# Patient Record
Sex: FEMALE | Race: WHITE | Employment: PART TIME | URBAN - METROPOLITAN AREA
[De-identification: names, ages, dates, MRNs, and addresses within clinical notes are randomized per-mention and may not be internally consistent; named-entity substitution may affect disease eponyms.]

---

## 2017-02-17 DIAGNOSIS — F41.9 ANXIETY: ICD-10-CM

## 2017-02-17 RX ORDER — ESCITALOPRAM OXALATE 5 MG/1
TABLET ORAL
Qty: 30 TABLET | Refills: 3 | Status: SHIPPED | OUTPATIENT
Start: 2017-02-17 | End: 2017-05-22 | Stop reason: SDUPTHER

## 2017-05-22 ENCOUNTER — OFFICE VISIT (OUTPATIENT)
Dept: FAMILY MEDICINE CLINIC | Age: 32
End: 2017-05-22

## 2017-05-22 VITALS
BODY MASS INDEX: 22.01 KG/M2 | DIASTOLIC BLOOD PRESSURE: 82 MMHG | SYSTOLIC BLOOD PRESSURE: 118 MMHG | HEIGHT: 69 IN | WEIGHT: 148.6 LBS | OXYGEN SATURATION: 99 % | HEART RATE: 97 BPM | RESPIRATION RATE: 13 BRPM

## 2017-05-22 DIAGNOSIS — F41.9 ANXIETY: Primary | ICD-10-CM

## 2017-05-22 PROCEDURE — 99213 OFFICE O/P EST LOW 20 MIN: CPT | Performed by: NURSE PRACTITIONER

## 2017-05-22 RX ORDER — ESCITALOPRAM OXALATE 5 MG/1
TABLET ORAL
Qty: 30 TABLET | Refills: 5 | Status: SHIPPED | OUTPATIENT
Start: 2017-05-22 | End: 2017-06-19

## 2017-05-22 ASSESSMENT — ENCOUNTER SYMPTOMS
VOMITING: 0
CHEST TIGHTNESS: 0
NAUSEA: 0
BACK PAIN: 0
COUGH: 0

## 2017-07-26 ENCOUNTER — TELEPHONE (OUTPATIENT)
Dept: FAMILY MEDICINE CLINIC | Age: 32
End: 2017-07-26

## 2017-08-03 ENCOUNTER — TELEPHONE (OUTPATIENT)
Dept: FAMILY MEDICINE CLINIC | Age: 32
End: 2017-08-03

## 2018-01-29 DIAGNOSIS — F41.9 ANXIETY: ICD-10-CM

## 2018-01-31 RX ORDER — ESCITALOPRAM OXALATE 5 MG/1
TABLET ORAL
Qty: 30 TABLET | Refills: 1 | Status: SHIPPED | OUTPATIENT
Start: 2018-01-31 | End: 2019-05-23

## 2019-05-23 ENCOUNTER — OFFICE VISIT (OUTPATIENT)
Dept: FAMILY MEDICINE CLINIC | Age: 34
End: 2019-05-23
Payer: COMMERCIAL

## 2019-05-23 VITALS
HEART RATE: 64 BPM | TEMPERATURE: 98.2 F | BODY MASS INDEX: 21.51 KG/M2 | SYSTOLIC BLOOD PRESSURE: 92 MMHG | WEIGHT: 145.2 LBS | DIASTOLIC BLOOD PRESSURE: 64 MMHG | RESPIRATION RATE: 16 BRPM | HEIGHT: 69 IN | OXYGEN SATURATION: 98 %

## 2019-05-23 DIAGNOSIS — T14.8XXA BRUISING: Primary | ICD-10-CM

## 2019-05-23 LAB
APTT: 32.2 SEC (ref 26–36)
INR BLD: 0.99 (ref 0.86–1.14)
PROTHROMBIN TIME: 11.3 SEC (ref 9.8–13)

## 2019-05-23 PROCEDURE — 99213 OFFICE O/P EST LOW 20 MIN: CPT | Performed by: NURSE PRACTITIONER

## 2019-05-23 RX ORDER — PHENOL 1.4 %
AEROSOL, SPRAY (ML) MUCOUS MEMBRANE
COMMUNITY

## 2019-05-23 RX ORDER — IODOQUINOL, HYDROCORTISONE ACETATE AND ALOE VERA LEAF 10; 20; 10 MG/G; MG/G; MG/G
GEL TOPICAL
COMMUNITY
End: 2020-09-23

## 2019-05-23 ASSESSMENT — PATIENT HEALTH QUESTIONNAIRE - PHQ9
2. FEELING DOWN, DEPRESSED OR HOPELESS: 0
1. LITTLE INTEREST OR PLEASURE IN DOING THINGS: 0
SUM OF ALL RESPONSES TO PHQ QUESTIONS 1-9: 0
SUM OF ALL RESPONSES TO PHQ QUESTIONS 1-9: 0
SUM OF ALL RESPONSES TO PHQ9 QUESTIONS 1 & 2: 0

## 2019-05-23 ASSESSMENT — ENCOUNTER SYMPTOMS
COLOR CHANGE: 1
NAUSEA: 0
VOMITING: 0
CHEST TIGHTNESS: 0
BACK PAIN: 0

## 2019-05-23 NOTE — PROGRESS NOTES
Subjective:      Patient ID: Aldo Quiles is a 36 y.o. female. HPI     Concerns with bruising on legs. Had labs done with OBGYN at [facility]. Bruising outer right thigh for past 2 weeks, now fading. Bruising lower leg noted Monday when walking. Only noted on legs. Denies menses being heavier. Usually lasts 3 days, neg for clotting. Review of Systems   Constitutional: Negative for appetite change, chills and fever. Respiratory: Negative for chest tightness. Cardiovascular: Negative for chest pain and palpitations. Gastrointestinal: Negative for nausea and vomiting. Musculoskeletal: Negative for arthralgias, back pain and gait problem. Skin: Positive for color change. Neurological: Negative for dizziness and headaches. Psychiatric/Behavioral: Negative. Objective:   Physical Exam   Constitutional: She appears well-developed and well-nourished. No distress. HENT:   Head: Normocephalic and atraumatic. Neck: Normal range of motion. Neck supple. Cardiovascular: Normal rate, regular rhythm and normal heart sounds. Pulmonary/Chest: Effort normal and breath sounds normal. No respiratory distress. Abdominal: Soft. Bowel sounds are normal. She exhibits no distension. Musculoskeletal: Normal range of motion. She exhibits no edema. Lymphadenopathy:     She has no cervical adenopathy. Neurological: She is alert. Skin: Skin is warm. No erythema. Yellowing bruising lateral upper thighs bilateral, unable to palpate hematoma. Bilateral lower lateral legs with scattered bruising, neg for hematoma palpation. Neg for petechiae, other bruising on body. Psychiatric: She has a normal mood and affect.  Her behavior is normal.        Current Outpatient Medications   Medication Sig Dispense Refill    Melatonin 10 MG TABS Take by mouth      Iodoquinol-HC-Aloe Polysacch (ALCORTIN A) 1-2-1 % GEL Apply topically      Prenatal Multivit-Min-Fe-FA (PRE- FORMULA) TABS Take 1 tablet by mouth daily      therapeutic multivitamin-minerals (THERAGRAN-M) tablet Take 1 tablet by mouth daily. OTC       Fexofenadine HCl (ALLEGRA PO) Take by mouth       No current facility-administered medications for this visit. Assessment:      1. Bruising        Plan:      1. .Filipe Smith was seen today for bleeding/bruising. Diagnoses and all orders for this visit:    Bruising  -     VON WILLEBRAND ANTIGEN  -     PROTIME-INR  -     APTT      2. Will await labs for further plan. Suspect trauma is the cause given isolated to legs and healing.      CBC and CMP at [facility] 4/30 normal.         MAT DE ANDA, APRN - CNP

## 2019-08-27 LAB — HIV AG/AB: NONREACTIVE

## 2020-07-09 ENCOUNTER — TELEPHONE (OUTPATIENT)
Dept: FAMILY MEDICINE CLINIC | Age: 35
End: 2020-07-09

## 2020-07-24 ENCOUNTER — TELEPHONE (OUTPATIENT)
Dept: FAMILY MEDICINE CLINIC | Age: 35
End: 2020-07-24

## 2020-07-24 NOTE — TELEPHONE ENCOUNTER
----- Message from Rehana Trimble sent at 7/24/2020  1:27 PM EDT -----  Subject: Message to Provider    QUESTIONS  Information for Provider? pt calling requesting appt for swimmers ear  ---------------------------------------------------------------------------  --------------  CALL BACK INFO  What is the best way for the office to contact you? OK to leave message on   voicemail  Preferred Call Back Phone Number? [phone]  ---------------------------------------------------------------------------  --------------  SCRIPT ANSWERS  Relationship to Patient? Self  Appointment reason? Symptomatic  Select script based on patient symptoms? Adult No Script  (Is the patient requesting to be seen routinely (not today or tomorrow)?    No

## 2020-07-24 NOTE — TELEPHONE ENCOUNTER
Concerned that she has water in ears from float tank. Did 48 hours ago and feels like water still in ears. Denies pain. Crackling sound when moving ears. Will be out of town next week. Recommend using OTC drops for swimmers ear to dry. Monitor If painful, or drainage call and will order ATB drops.

## 2020-09-23 ENCOUNTER — OFFICE VISIT (OUTPATIENT)
Dept: FAMILY MEDICINE CLINIC | Age: 35
End: 2020-09-23
Payer: COMMERCIAL

## 2020-09-23 VITALS
HEIGHT: 68 IN | OXYGEN SATURATION: 99 % | WEIGHT: 152.6 LBS | DIASTOLIC BLOOD PRESSURE: 72 MMHG | HEART RATE: 88 BPM | TEMPERATURE: 97.9 F | RESPIRATION RATE: 16 BRPM | SYSTOLIC BLOOD PRESSURE: 122 MMHG | BODY MASS INDEX: 23.13 KG/M2

## 2020-09-23 PROCEDURE — 90471 IMMUNIZATION ADMIN: CPT | Performed by: NURSE PRACTITIONER

## 2020-09-23 PROCEDURE — 99395 PREV VISIT EST AGE 18-39: CPT | Performed by: NURSE PRACTITIONER

## 2020-09-23 PROCEDURE — 90686 IIV4 VACC NO PRSV 0.5 ML IM: CPT | Performed by: NURSE PRACTITIONER

## 2020-09-23 RX ORDER — FLUOXETINE HYDROCHLORIDE 20 MG/1
20 CAPSULE ORAL DAILY
COMMUNITY
End: 2021-08-11 | Stop reason: SDUPTHER

## 2020-09-23 ASSESSMENT — ENCOUNTER SYMPTOMS
WHEEZING: 0
CONSTIPATION: 0
SHORTNESS OF BREATH: 0
NAUSEA: 0
COUGH: 0
DIARRHEA: 0

## 2020-09-23 ASSESSMENT — PATIENT HEALTH QUESTIONNAIRE - PHQ9
1. LITTLE INTEREST OR PLEASURE IN DOING THINGS: 0
SUM OF ALL RESPONSES TO PHQ QUESTIONS 1-9: 0
2. FEELING DOWN, DEPRESSED OR HOPELESS: 0
SUM OF ALL RESPONSES TO PHQ QUESTIONS 1-9: 0
SUM OF ALL RESPONSES TO PHQ9 QUESTIONS 1 & 2: 0

## 2020-09-23 NOTE — PROGRESS NOTES
Vaccine Information Sheet, \"Influenza - Inactivated\"  given to Shirley Stinson, or parent/legal guardian of  Shirley Stinson and verbalized understanding. Patient responses:    Have you ever had a reaction to a flu vaccine? No  Do you have any current illness? No  Have you ever had Guillian [place] Syndrome? No  Do you have a serious allergy to any of the follow: Neomycin, Polymyxin, Thimerosal, eggs or egg products? No    Flu vaccine given per order. Please see immunization tab. Risks and benefits explained. Current VIS given.

## 2020-09-23 NOTE — PROGRESS NOTES
on file    Food insecurity     Worry: Not on file     Inability: Not on file    Transportation needs     Medical: Not on file     Non-medical: Not on file   Tobacco Use    Smoking status: Never Smoker    Smokeless tobacco: Never Used   Substance and Sexual Activity    Alcohol use: Yes     Alcohol/week: 2.0 standard drinks     Types: 2 Cans of beer per week    Drug use: No    Sexual activity: Yes     Partners: Male   Lifestyle    Physical activity     Days per week: Not on file     Minutes per session: Not on file    Stress: Not on file   Relationships    Social connections     Talks on phone: Not on file     Gets together: Not on file     Attends Jew service: Not on file     Active member of club or organization: Not on file     Attends meetings of clubs or organizations: Not on file     Relationship status: Not on file    Intimate partner violence     Fear of current or ex partner: Not on file     Emotionally abused: Not on file     Physically abused: Not on file     Forced sexual activity: Not on file   Other Topics Concern    Not on file   Social History Narrative    Not on file        Family History   Problem Relation Age of Onset    Anxiety Disorder Mother     Other Father         abdominal aortic aneurysm    Anxiety Disorder Father     Cancer Father         Leukemia    No Known Problems Brother     Other Paternal Grandmother         abdominal aortic aneurysm    No Known Problems Brother        ADVANCE DIRECTIVE: N, <no information>    Vitals:    09/23/20 1448   BP: 122/72   Site: Right Upper Arm   Position: Sitting   Cuff Size: Medium Adult   Pulse: 88   Resp: 16   Temp: 97.9 °F (36.6 °C)   TempSrc: Temporal   SpO2: 99%   Weight: 152 lb 9.6 oz (69.2 kg)   Height: 5' 8.25\" (1.734 m)     Estimated body mass index is 23.03 kg/m² as calculated from the following:    Height as of this encounter: 5' 8.25\" (1.734 m). Weight as of this encounter: 152 lb 9.6 oz (69.2 kg).     Physical Exam  Vitals signs reviewed. Constitutional:       General: She is not in acute distress. Appearance: Normal appearance. She is well-developed. HENT:      Head: Normocephalic. Cardiovascular:      Rate and Rhythm: Normal rate and regular rhythm. Heart sounds: Normal heart sounds. Pulmonary:      Effort: Pulmonary effort is normal.      Breath sounds: Normal breath sounds. Abdominal:      General: Bowel sounds are normal.      Palpations: Abdomen is soft. Musculoskeletal: Normal range of motion. Skin:     General: Skin is warm and dry. Neurological:      General: No focal deficit present. Mental Status: She is alert and oriented to person, place, and time. No flowsheet data found. No results found for: CHOL, CHOLFAST, TRIG, TRIGLYCFAST, HDL, LDLCHOLESTEROL, LDLCALC, GLUF, GLUCOSE, LABA1C    The ASCVD Risk score (Nick Carreon., et al., 2013) failed to calculate for the following reasons: The 2013 ASCVD risk score is only valid for ages 36 to 78    Immunization History   Administered Date(s) Administered    Influenza, Quadv, IM, (6 mo and older Fluzone, Flulaval, Fluarix and 3 yrs and older Afluria) 09/29/2016    Tdap (Boostrix, Adacel) 09/29/2016       Health Maintenance   Topic Date Due    Varicella vaccine (1 of 2 - 2-dose childhood series) 02/07/1987    HIV screen  02/07/2001    Cervical cancer screen  01/01/2014    Flu vaccine (1) 09/01/2020    DTaP/Tdap/Td vaccine (3 - Td) 12/18/2029    Hepatitis A vaccine  Aged Out    Hepatitis B vaccine  Aged Out    Hib vaccine  Aged Out    Meningococcal (ACWY) vaccine  Aged Out    Pneumococcal 0-64 years Vaccine  Aged Out       ASSESSMENT/PLAN:    George Carpenter was seen today for annual exam.    Diagnoses and all orders for this visit:    Preventative health care  -     CBC Auto Differential; Future  -     Comprehensive Metabolic Panel; Future  -     Lipid Panel;  Future    Flu vaccine need  -     INFLUENZA, QUADV, 3 YRS AND OLDER, IM PF, PREFILL SYR OR SDV, 0.5ML (AFLURIA QUADV, PF)    Anxiety      An electronic signature was used to authenticate this note.    --IVANA CERON - CNP on 9/23/2020 at 3:56 PM

## 2020-10-28 ENCOUNTER — TELEPHONE (OUTPATIENT)
Dept: FAMILY MEDICINE CLINIC | Age: 35
End: 2020-10-28

## 2020-10-28 ENCOUNTER — OFFICE VISIT (OUTPATIENT)
Dept: FAMILY MEDICINE CLINIC | Age: 35
End: 2020-10-28
Payer: COMMERCIAL

## 2020-10-28 ENCOUNTER — OFFICE VISIT (OUTPATIENT)
Dept: PRIMARY CARE CLINIC | Age: 35
End: 2020-10-28
Payer: COMMERCIAL

## 2020-10-28 VITALS — HEART RATE: 98 BPM | OXYGEN SATURATION: 99 % | TEMPERATURE: 97.8 F

## 2020-10-28 DIAGNOSIS — Z11.59 SCREENING FOR VIRAL DISEASE: Primary | ICD-10-CM

## 2020-10-28 LAB — S PYO AG THROAT QL: NORMAL

## 2020-10-28 PROCEDURE — 99211 OFF/OP EST MAY X REQ PHY/QHP: CPT | Performed by: FAMILY MEDICINE

## 2020-10-28 PROCEDURE — 99213 OFFICE O/P EST LOW 20 MIN: CPT | Performed by: NURSE PRACTITIONER

## 2020-10-28 PROCEDURE — G8420 CALC BMI NORM PARAMETERS: HCPCS | Performed by: FAMILY MEDICINE

## 2020-10-28 PROCEDURE — G8428 CUR MEDS NOT DOCUMENT: HCPCS | Performed by: FAMILY MEDICINE

## 2020-10-28 PROCEDURE — 87880 STREP A ASSAY W/OPTIC: CPT | Performed by: NURSE PRACTITIONER

## 2020-10-28 ASSESSMENT — ENCOUNTER SYMPTOMS
FACIAL SWELLING: 0
RHINORRHEA: 0
VOICE CHANGE: 0
SINUS PRESSURE: 0
SINUS PAIN: 0
RESPIRATORY NEGATIVE: 1
GASTROINTESTINAL NEGATIVE: 1
EYES NEGATIVE: 1
TROUBLE SWALLOWING: 0
SORE THROAT: 1

## 2020-10-28 NOTE — PATIENT INSTRUCTIONS

## 2020-10-28 NOTE — PROGRESS NOTES
Jozef Salcedo received a viral test for COVID-19. They were educated on isolation and quarantine as appropriate. For any symptoms, they were directed to seek care from their PCP, given contact information to establish with a doctor, directed to an urgent care or the emergency room.

## 2020-10-28 NOTE — PROGRESS NOTES
10/28/2020     Mary Metz (:  1985) is a 30 y.o. female, here for evaluation of the following medical concerns:    ILS Garay is being seen today for a sore throat which started 2 days ago. Denies fever, chills, HA, N/V/D. She went to [facility] thr clinic and was tested for COVID. Review of Systems   Constitutional: Negative. HENT: Positive for sore throat. Negative for congestion, dental problem, ear discharge, ear pain, facial swelling, postnasal drip, rhinorrhea, sinus pressure, sinus pain, sneezing, tinnitus, trouble swallowing and voice change. Eyes: Negative. Respiratory: Negative. Cardiovascular: Negative. Gastrointestinal: Negative. Skin: Negative. Neurological: Negative. Hematological: Negative. Prior to Visit Medications    Medication Sig Taking? Authorizing Provider   Loratadine (CLARITIN PO) Take by mouth daily  Historical Provider, MD   FLUoxetine (PROZAC) 20 MG capsule Take 20 mg by mouth daily  Historical Provider, MD   Melatonin 10 MG TABS Take by mouth  Historical Provider, MD        Social History     Tobacco Use    Smoking status: Never Smoker    Smokeless tobacco: Never Used   Substance Use Topics    Alcohol use: Yes     Alcohol/week: 2.0 standard drinks     Types: 2 Cans of beer per week        Vitals:    10/28/20 1336   Pulse: 98   Temp: 97.8 °F (36.6 °C)   SpO2: 99%     Estimated body mass index is 23.03 kg/m² as calculated from the following:    Height as of 20: 5' 8.25\" (1.734 m). Weight as of 20: 152 lb 9.6 oz (69.2 kg). Physical Exam  Vitals signs reviewed. Constitutional:       General: She is not in acute distress. Appearance: Normal appearance. She is ill-appearing. She is not diaphoretic. HENT:      Head: Normocephalic. Right Ear: There is no impacted cerumen. Left Ear: There is no impacted cerumen. Nose: Congestion and rhinorrhea present.       Mouth/Throat:      Mouth: Mucous membranes are moist.      Pharynx: Oropharynx is clear. Posterior oropharyngeal erythema present. No oropharyngeal exudate. Eyes:      General:         Right eye: No discharge. Left eye: No discharge. Pupils: Pupils are equal, round, and reactive to light. Neck:      Musculoskeletal: Normal range of motion. No neck rigidity. Cardiovascular:      Rate and Rhythm: Normal rate and regular rhythm. Heart sounds: Normal heart sounds. No murmur. Pulmonary:      Effort: Pulmonary effort is normal. No respiratory distress. Breath sounds: Normal breath sounds. No stridor. No wheezing or rhonchi. Lymphadenopathy:      Cervical: No cervical adenopathy. Skin:     General: Skin is warm and dry. Findings: No rash. Neurological:      Mental Status: She is alert and oriented to person, place, and time. ASSESSMENT/PLAN:  1. Acute viral pharyngitis  Rapid strep test was negative. Pt was tested at [facility] drive thru today for COVID. Will sent throat culture and notify pt of results. - POCT rapid strep A  - Culture, Throat      Return if symptoms worsen or fail to improve. An electronic signature was used to authenticate this note.     --IVANA Mohamud - CNP on 10/28/2020 at 1:46 PM

## 2020-10-30 LAB — SARS-COV-2, NAA: NOT DETECTED

## 2020-10-31 LAB — THROAT CULTURE: NORMAL

## 2020-11-03 RX ORDER — PENICILLIN V POTASSIUM 500 MG/1
500 TABLET ORAL 2 TIMES DAILY
Qty: 20 TABLET | Refills: 0 | Status: SHIPPED | OUTPATIENT
Start: 2020-11-03 | End: 2020-11-13

## 2020-11-12 DIAGNOSIS — Z00.00 PREVENTATIVE HEALTH CARE: ICD-10-CM

## 2020-11-12 LAB
A/G RATIO: 1.7 (ref 1.1–2.2)
ALBUMIN SERPL-MCNC: 4.8 G/DL (ref 3.4–5)
ALP BLD-CCNC: 57 U/L (ref 40–129)
ALT SERPL-CCNC: 13 U/L (ref 10–40)
ANION GAP SERPL CALCULATED.3IONS-SCNC: 13 MMOL/L (ref 3–16)
AST SERPL-CCNC: 16 U/L (ref 15–37)
BASOPHILS ABSOLUTE: 0 K/UL (ref 0–0.2)
BASOPHILS RELATIVE PERCENT: 0.2 %
BILIRUB SERPL-MCNC: 0.5 MG/DL (ref 0–1)
BUN BLDV-MCNC: 10 MG/DL (ref 7–20)
CALCIUM SERPL-MCNC: 9.4 MG/DL (ref 8.3–10.6)
CHLORIDE BLD-SCNC: 104 MMOL/L (ref 99–110)
CHOLESTEROL, TOTAL: 174 MG/DL (ref 0–199)
CO2: 22 MMOL/L (ref 21–32)
CREAT SERPL-MCNC: 0.6 MG/DL (ref 0.6–1.1)
EOSINOPHILS ABSOLUTE: 0.2 K/UL (ref 0–0.6)
EOSINOPHILS RELATIVE PERCENT: 1.8 %
GFR AFRICAN AMERICAN: >60
GFR NON-AFRICAN AMERICAN: >60
GLOBULIN: 2.8 G/DL
GLUCOSE BLD-MCNC: 84 MG/DL (ref 70–99)
HCT VFR BLD CALC: 41.2 % (ref 36–48)
HDLC SERPL-MCNC: 55 MG/DL (ref 40–60)
HEMOGLOBIN: 14 G/DL (ref 12–16)
LDL CHOLESTEROL CALCULATED: 88 MG/DL
LYMPHOCYTES ABSOLUTE: 2.2 K/UL (ref 1–5.1)
LYMPHOCYTES RELATIVE PERCENT: 23.1 %
MCH RBC QN AUTO: 32.1 PG (ref 26–34)
MCHC RBC AUTO-ENTMCNC: 34 G/DL (ref 31–36)
MCV RBC AUTO: 94.2 FL (ref 80–100)
MONOCYTES ABSOLUTE: 0.4 K/UL (ref 0–1.3)
MONOCYTES RELATIVE PERCENT: 4 %
NEUTROPHILS ABSOLUTE: 6.8 K/UL (ref 1.7–7.7)
NEUTROPHILS RELATIVE PERCENT: 70.9 %
PDW BLD-RTO: 13.1 % (ref 12.4–15.4)
PLATELET # BLD: 297 K/UL (ref 135–450)
PMV BLD AUTO: 7.3 FL (ref 5–10.5)
POTASSIUM SERPL-SCNC: 3.9 MMOL/L (ref 3.5–5.1)
RBC # BLD: 4.38 M/UL (ref 4–5.2)
SODIUM BLD-SCNC: 139 MMOL/L (ref 136–145)
TOTAL PROTEIN: 7.6 G/DL (ref 6.4–8.2)
TRIGL SERPL-MCNC: 157 MG/DL (ref 0–150)
VLDLC SERPL CALC-MCNC: 31 MG/DL
WBC # BLD: 9.6 K/UL (ref 4–11)

## 2020-11-23 ENCOUNTER — OFFICE VISIT (OUTPATIENT)
Dept: PRIMARY CARE CLINIC | Age: 35
End: 2020-11-23
Payer: COMMERCIAL

## 2020-11-23 PROCEDURE — 99211 OFF/OP EST MAY X REQ PHY/QHP: CPT | Performed by: NURSE PRACTITIONER

## 2020-11-23 NOTE — PATIENT INSTRUCTIONS
Advance Care Planning  People with COVID-19 may have no symptoms, mild symptoms, such as fever, cough, and shortness of breath or they may have more severe illness, developing severe and fatal pneumonia. As a result, Advance Care Planning with attention to naming a health care decision maker (someone you trust to make healthcare decisions for you if you could not speak for yourself) and sharing other health care preferences is important BEFORE a possible health crisis. Please contact your Primary Care Provider to discuss Advance Care Planning. Preventing the Spread of Coronavirus Disease 2019 in Homes and Residential Communities  For the most recent information go to [email or web address]    Prevention steps for People with confirmed or suspected COVID-19 (including persons under investigation) who do not need to be hospitalized  and   People with confirmed COVID-19 who were hospitalized and determined to be medically stable to go home    Your healthcare provider and public health staff will evaluate whether you can be cared for at home. If it is determined that you do not need to be hospitalized and can be isolated at home, you will be monitored by staff from your local or state health department. You should follow the prevention steps below until a healthcare provider or local or state health department says you can return to your normal activities. Stay home except to get medical care  People who are mildly ill with COVID-19 are able to isolate at home during their illness. You should restrict activities outside your home, except for getting medical care. Do not go to work, school, or public areas. Avoid using public transportation, ride-sharing, or taxis. Separate yourself from other people and animals in your home  People: As much as possible, you should stay in a specific room and away from other people in your home.  Also, you should use a separate have a medical emergency and need to call 911, notify the dispatch personnel that you have, or are being evaluated for COVID-19. If possible, put on a facemask before emergency medical services arrive. Discontinuing home isolation  Patients with confirmed COVID-19 should remain under home isolation precautions until the risk of secondary transmission to others is thought to be low. The decision to discontinue home isolation precautions should be made on a case-by-case basis, in consultation with healthcare providers and state and local health departments.

## 2020-11-24 LAB — SARS-COV-2, NAA: NOT DETECTED

## 2021-08-10 ENCOUNTER — TELEPHONE (OUTPATIENT)
Dept: FAMILY MEDICINE CLINIC | Age: 36
End: 2021-08-10

## 2021-08-10 NOTE — TELEPHONE ENCOUNTER
Pt. States she had a BP checked at [facility] today by pharmacist using a wrist cuff and it was 169/105 then 10 mins later 152/101. States her Bp at the sleep clinic this am was 128/99. Pt. Does not have any headache or chest pain. \" feels perfectly fine\"   Does not have history of HTN. I did schedule Evelyn for an appointment tomorrow and advised her that if she would start with any concerning symptoms such as chest pain, SOB, or headache she should be evaluated at the ER.

## 2021-08-11 ENCOUNTER — OFFICE VISIT (OUTPATIENT)
Dept: FAMILY MEDICINE CLINIC | Age: 36
End: 2021-08-11
Payer: COMMERCIAL

## 2021-08-11 VITALS
OXYGEN SATURATION: 98 % | DIASTOLIC BLOOD PRESSURE: 86 MMHG | HEART RATE: 76 BPM | RESPIRATION RATE: 18 BRPM | WEIGHT: 158 LBS | BODY MASS INDEX: 23.85 KG/M2 | SYSTOLIC BLOOD PRESSURE: 132 MMHG

## 2021-08-11 DIAGNOSIS — R03.0 ELEVATED BLOOD PRESSURE READING: ICD-10-CM

## 2021-08-11 DIAGNOSIS — F41.9 ANXIETY: Primary | ICD-10-CM

## 2021-08-11 PROCEDURE — G8427 DOCREV CUR MEDS BY ELIG CLIN: HCPCS | Performed by: NURSE PRACTITIONER

## 2021-08-11 PROCEDURE — G8420 CALC BMI NORM PARAMETERS: HCPCS | Performed by: NURSE PRACTITIONER

## 2021-08-11 PROCEDURE — 99214 OFFICE O/P EST MOD 30 MIN: CPT | Performed by: NURSE PRACTITIONER

## 2021-08-11 PROCEDURE — 1036F TOBACCO NON-USER: CPT | Performed by: NURSE PRACTITIONER

## 2021-08-11 RX ORDER — LORATADINE 10 MG/1
10 TABLET ORAL DAILY
COMMUNITY
End: 2021-11-02

## 2021-08-11 RX ORDER — FLUOXETINE HYDROCHLORIDE 40 MG/1
40 CAPSULE ORAL DAILY
Qty: 90 CAPSULE | Refills: 1 | Status: SHIPPED | OUTPATIENT
Start: 2021-08-11 | End: 2021-08-24 | Stop reason: SDUPTHER

## 2021-08-11 SDOH — ECONOMIC STABILITY: FOOD INSECURITY: WITHIN THE PAST 12 MONTHS, YOU WORRIED THAT YOUR FOOD WOULD RUN OUT BEFORE YOU GOT MONEY TO BUY MORE.: NEVER TRUE

## 2021-08-11 SDOH — ECONOMIC STABILITY: FOOD INSECURITY: WITHIN THE PAST 12 MONTHS, THE FOOD YOU BOUGHT JUST DIDN'T LAST AND YOU DIDN'T HAVE MONEY TO GET MORE.: NEVER TRUE

## 2021-08-11 ASSESSMENT — PATIENT HEALTH QUESTIONNAIRE - PHQ9
2. FEELING DOWN, DEPRESSED OR HOPELESS: 0
SUM OF ALL RESPONSES TO PHQ QUESTIONS 1-9: 0
SUM OF ALL RESPONSES TO PHQ QUESTIONS 1-9: 0
1. LITTLE INTEREST OR PLEASURE IN DOING THINGS: 0
SUM OF ALL RESPONSES TO PHQ QUESTIONS 1-9: 0
2. FEELING DOWN, DEPRESSED OR HOPELESS: 0
1. LITTLE INTEREST OR PLEASURE IN DOING THINGS: 0
SUM OF ALL RESPONSES TO PHQ9 QUESTIONS 1 & 2: 0
SUM OF ALL RESPONSES TO PHQ QUESTIONS 1-9: 0
SUM OF ALL RESPONSES TO PHQ QUESTIONS 1-9: 0
2. FEELING DOWN, DEPRESSED OR HOPELESS: 0
SUM OF ALL RESPONSES TO PHQ9 QUESTIONS 1 & 2: 0
SUM OF ALL RESPONSES TO PHQ QUESTIONS 1-9: 0

## 2021-08-11 ASSESSMENT — SOCIAL DETERMINANTS OF HEALTH (SDOH): HOW HARD IS IT FOR YOU TO PAY FOR THE VERY BASICS LIKE FOOD, HOUSING, MEDICAL CARE, AND HEATING?: NOT HARD AT ALL

## 2021-08-11 NOTE — PROGRESS NOTES
Speedy Funk (:  1985) is a 29 y.o. female,Established patient, here for evaluation of the following chief complaint(s):  Hypertension         ASSESSMENT/PLAN:  1. Anxiety  -     FLUoxetine (PROZAC) 40 MG capsule; Take 1 capsule by mouth daily, Disp-90 capsule, R-1Normal    Blood pressure stable today. Do not feel BP is an issue. Limited lori in [employer] wrist cuff. Discussed low sodium diet, less than 2000 mg daily    Will increase prozac to 40 mg daily for added stressors at home. Monitor for improvement    Follow up with GYN next week is planned, He initially px the prozac. Return in about 17 weeks (around 2021) for routine office visit. Subjective   SUBJECTIVE/OBJECTIVE:  HPI     Yesterday went to Sleep center and BP was 128/99, not sure of the type of test. Went to [employer] wrist cuff and was higher. Having some stress at home with husbands mental health issues and angered by their toddler at times. Review of Systems   All other systems reviewed and are negative. Objective   Physical Exam  Constitutional:       Appearance: Normal appearance. She is well-developed. HENT:      Head: Normocephalic and atraumatic. Neck:      Thyroid: No thyromegaly. Vascular: No carotid bruit. Cardiovascular:      Rate and Rhythm: Normal rate and regular rhythm. Pulmonary:      Effort: Pulmonary effort is normal.      Breath sounds: Normal breath sounds. Abdominal:      General: Bowel sounds are normal.      Palpations: Abdomen is soft. Musculoskeletal:         General: Normal range of motion. Cervical back: Normal range of motion and neck supple. Skin:     General: Skin is warm. Neurological:      Mental Status: She is alert.    Psychiatric:         Behavior: Behavior normal.              Current Outpatient Medications   Medication Sig Dispense Refill    loratadine (CLARITIN) 10 MG tablet Take 10 mg by mouth daily      Norethindrone Acet-Ethinyl Est (LOESTRIN 1/20, 21, PO) Take 1 tablet by mouth daily      FLUoxetine (PROZAC) 40 MG capsule Take 1 capsule by mouth daily 90 capsule 1    Melatonin 10 MG TABS Take by mouth       No current facility-administered medications for this visit.              An electronic signature was used to authenticate this note.    --MAT DE ANDA, IVANA - CNP

## 2021-08-24 DIAGNOSIS — F41.9 ANXIETY: ICD-10-CM

## 2021-08-24 RX ORDER — FLUOXETINE HYDROCHLORIDE 20 MG/1
20 CAPSULE ORAL DAILY
Qty: 90 CAPSULE | Refills: 1 | Status: SHIPPED | OUTPATIENT
Start: 2021-08-24 | End: 2021-11-02 | Stop reason: SDUPTHER

## 2021-11-02 ENCOUNTER — OFFICE VISIT (OUTPATIENT)
Dept: FAMILY MEDICINE CLINIC | Age: 36
End: 2021-11-02
Payer: COMMERCIAL

## 2021-11-02 VITALS
RESPIRATION RATE: 18 BRPM | DIASTOLIC BLOOD PRESSURE: 80 MMHG | OXYGEN SATURATION: 97 % | SYSTOLIC BLOOD PRESSURE: 130 MMHG | WEIGHT: 159.8 LBS | BODY MASS INDEX: 23.67 KG/M2 | HEIGHT: 69 IN | HEART RATE: 80 BPM

## 2021-11-02 DIAGNOSIS — Z23 FLU VACCINE NEED: ICD-10-CM

## 2021-11-02 DIAGNOSIS — L91.8 SKIN TAG: ICD-10-CM

## 2021-11-02 DIAGNOSIS — Z00.00 ENCOUNTER FOR WELL ADULT EXAM WITHOUT ABNORMAL FINDINGS: Primary | ICD-10-CM

## 2021-11-02 DIAGNOSIS — Z80.3 FAMILY HISTORY OF BREAST CANCER: ICD-10-CM

## 2021-11-02 DIAGNOSIS — Z11.59 ENCOUNTER FOR HCV SCREENING TEST FOR LOW RISK PATIENT: ICD-10-CM

## 2021-11-02 DIAGNOSIS — F41.9 ANXIETY: ICD-10-CM

## 2021-11-02 PROCEDURE — 90674 CCIIV4 VAC NO PRSV 0.5 ML IM: CPT | Performed by: NURSE PRACTITIONER

## 2021-11-02 PROCEDURE — 99395 PREV VISIT EST AGE 18-39: CPT | Performed by: NURSE PRACTITIONER

## 2021-11-02 PROCEDURE — G8482 FLU IMMUNIZE ORDER/ADMIN: HCPCS | Performed by: NURSE PRACTITIONER

## 2021-11-02 PROCEDURE — 90471 IMMUNIZATION ADMIN: CPT | Performed by: NURSE PRACTITIONER

## 2021-11-02 RX ORDER — FLUOXETINE HYDROCHLORIDE 20 MG/1
20 CAPSULE ORAL DAILY
Qty: 90 CAPSULE | Refills: 3 | Status: SHIPPED | OUTPATIENT
Start: 2021-11-02

## 2021-11-02 RX ORDER — NORETHINDRONE ACETATE AND ETHINYL ESTRADIOL, AND FERROUS FUMARATE 1MG-20(24)
1 KIT ORAL DAILY
COMMUNITY
Start: 2021-10-31

## 2021-11-02 ASSESSMENT — ENCOUNTER SYMPTOMS
CHEST TIGHTNESS: 0
DIARRHEA: 0
NAUSEA: 0
SHORTNESS OF BREATH: 0
BACK PAIN: 0
TROUBLE SWALLOWING: 0

## 2021-11-02 NOTE — PROGRESS NOTES
Well Adult Note  Name: Yareli Camara Date: 2021   MRN: <[id number]> Sex: Female   Age: 29 y.o. Ethnicity: Non- / Non    : 1985 Race: White (non-)      Fidelina Heaton is here for well adult exam.  History:    Taking prozac and decided to stay at 20 mg. Wondering if is causing bruising. Has a mole on back for extended time. Not sure if has had changes. Has appt in 1 week with rectal surgeon, Dr. Gemma Mahoney that removed anal warts and hemorrhoids. Now with purple enlarged area but not tender. Wondering if she should move the appt up. Review of Systems   Constitutional: Negative for chills, fever and unexpected weight change. HENT: Negative for trouble swallowing. Respiratory: Negative for chest tightness and shortness of breath. Cardiovascular: Negative for chest pain and palpitations. Gastrointestinal: Negative for diarrhea and nausea. Genitourinary: Negative for difficulty urinating. Musculoskeletal: Negative for arthralgias, back pain and gait problem. Neurological: Negative for dizziness and headaches. Psychiatric/Behavioral: Negative. Allergies   Allergen Reactions    Promethazine-Codeine Rash    Diflucan [Fluconazole] Other (See Comments)     Tongue numb day after taken         Prior to Visit Medications    Medication Sig Taking?  Authorizing Provider   DEVONTE 24 FE 1-20 MG-MCG(24) TABS Take 1 tablet by mouth daily Yes Historical Provider, MD   FLUoxetine (PROZAC) 20 MG capsule Take 1 capsule by mouth daily Yes IVANA Campbell - CNP   Melatonin 10 MG TABS Take by mouth Yes Historical Provider, MD         Past Medical History:   Diagnosis Date    Anxiety 2016       Past Surgical History:   Procedure Laterality Date    COLONOSCOPY  2016    Dr. Phil Cannon to be normal.     GUM SURGERY      HEMORRHOID SURGERY  2016    internal hemorrhoid and anal warts    WISDOM TOOTH EXTRACTION           Family History Problem Relation Age of Onset    Anxiety Disorder Mother     Other Father         abdominal aortic aneurysm    Anxiety Disorder Father     Cancer Father         Leukemia    High Blood Pressure Father     No Known Problems Brother     Other Paternal Grandmother         abdominal aortic aneurysm    No Known Problems Brother     Other Maternal Grandmother         hx rheumatic fever as child and passes away during second pregnancy related to heart disease    Cancer Paternal Grandfather     Breast Cancer Paternal Cousin 39        breast       Social History     Tobacco Use    Smoking status: Never Smoker    Smokeless tobacco: Never Used   Vaping Use    Vaping Use: Never used   Substance Use Topics    Alcohol use: Yes     Alcohol/week: 2.0 standard drinks     Types: 2 Cans of beer per week    Drug use: No       Objective   /80 (Site: Right Upper Arm, Position: Sitting, Cuff Size: Medium Adult)   Pulse 80   Resp 18   Ht 5' 9\" (1.753 m)   Wt 159 lb 12.8 oz (72.5 kg)   LMP 11/01/2021   SpO2 97%   BMI 23.60 kg/m²   Wt Readings from Last 3 Encounters:   11/02/21 159 lb 12.8 oz (72.5 kg)   08/11/21 158 lb (71.7 kg)   09/23/20 152 lb 9.6 oz (69.2 kg)       Physical Exam  Constitutional:       Appearance: Normal appearance. She is well-developed. HENT:      Head: Normocephalic and atraumatic. Neck:      Thyroid: No thyromegaly. Cardiovascular:      Rate and Rhythm: Normal rate and regular rhythm. Pulmonary:      Effort: Pulmonary effort is normal.      Breath sounds: Normal breath sounds. Abdominal:      General: Bowel sounds are normal.      Palpations: Abdomen is soft. Musculoskeletal:         General: Normal range of motion. Cervical back: Normal range of motion and neck supple. Skin:     General: Skin is warm. Comments: Skin lesion on back of concern is natural colored, soft, raised, wide base, neg for discoloration or irregular growth.     Neurological:      Mental Status: She is alert. Psychiatric:         Behavior: Behavior normal.           Assessment   Plan   1. Family history of breast cancer  -     Marlene Rios MD, Breast Surgery, [facility]  2. Anxiety  -     FLUoxetine (PROZAC) 20 MG capsule; Take 1 capsule by mouth daily, Disp-90 capsule, R-3Normal  -     Comprehensive Metabolic Panel  3. Skin tag  -     AFL - Norma Weaver DO, Dermatology, [facility]  4. Encounter for HCV screening test for low risk patient  -     Hepatitis C Antibody  5. Flu vaccine need  -     INFLUENZA, MDCK QUADV, 2 YRS AND OLDER, IM, PF, PREFILL SYR OR SDV, 0.5ML (FLUCELVAX QUADV, PF)  6. Encounter for well adult exam without abnormal findings     Requesting to establish with dermatology for routine evaluation. Continue prozac at 20 mg daily. Will refer to Breast specialist for discussion around breast cancer risk. Discussed paternal cousin is isolated case and distant in relationship.      Encouraged monthly breast self exam.       Personalized Preventive Plan   Current Health Maintenance Status  Immunization History   Administered Date(s) Administered    COVID-19, Pfizer, PF, 30mcg/0.3mL 03/29/2021, 04/19/2021    Influenza, Quadv, IM, (6 mo and older Fluzone, Flulaval, Fluarix and 3 yrs and older Afluria) 09/29/2016    Influenza, Quadv, IM, PF (6 mo and older Fluzone, Flulaval, Fluarix, and 3 yrs and older Afluria) 09/23/2020    Tdap (Boostrix, Adacel) 09/29/2016        Health Maintenance   Topic Date Due    Hepatitis C screen  Never done    Varicella vaccine (1 of 2 - 2-dose childhood series) Never done    HIV screen  Never done    Flu vaccine (1) 09/01/2021    Cervical cancer screen  08/11/2023    DTaP/Tdap/Td vaccine (2 - Td or Tdap) 09/29/2026    COVID-19 Vaccine  Completed    Hepatitis A vaccine  Aged Out    Hepatitis B vaccine  Aged Out    Hib vaccine  Aged Out    Meningococcal (ACWY) vaccine  Aged Out    Pneumococcal 0-64 years Vaccine Aged Out     Recommendations for Preventive Services Due: see orders and patient instructions/AVS.  .

## 2021-11-02 NOTE — PROGRESS NOTES
Vaccine Information Sheet, \"Influenza - Inactivated\"  given to Stalin Jay, or parent/legal guardian of  Stalin Jay and verbalized understanding. Patient responses:    Have you ever had a reaction to a flu vaccine? No  Do you have any current illness? No  Have you ever had Guillian [place] Syndrome? No  Do you have a serious allergy to any of the follow: Neomycin, Polymyxin, Thimerosal, eggs or egg products? No    Flu vaccine given per order. Please see immunization tab. Risks and benefits explained. Current VIS given.       Immunizations Administered     Name Date Dose Route    Influenza, MDCK Quadv, IM, PF (Flucelvax 2 yrs and older) 11/2/2021 0.5 mL Intramuscular    Site: Deltoid- Left    Lot: [id number]    NDC: 21563-948-49

## 2021-12-07 NOTE — PROGRESS NOTES
Gynecologic History  Menarche at age 18    She delivered her first child at age 30, and did breastfeed  Premenopausal  No hysterectomy  Oral contraceptive use: yes for 15 years and is not currently taking  Hormone use: no and is not currently taking    Bra Size: \"Small\"    Review of Systems   Constitutional: Negative for unexpected weight change. Eyes: Negative for visual disturbance. Respiratory: Negative for cough and shortness of breath. Cardiovascular: Negative for chest pain and palpitations. Gastrointestinal: Negative for abdominal pain. Musculoskeletal: Negative for arthralgias and myalgias. Neurological: Negative for headaches. Hematological: Negative for adenopathy. Bruises/bleeds easily. Psychiatric/Behavioral: Negative for dysphoric mood. The patient is nervous/anxious (Fluoxitine).

## 2021-12-10 ENCOUNTER — OFFICE VISIT (OUTPATIENT)
Dept: SURGERY | Age: 36
End: 2021-12-10
Payer: COMMERCIAL

## 2021-12-10 VITALS
HEIGHT: 69 IN | RESPIRATION RATE: 16 BRPM | OXYGEN SATURATION: 98 % | SYSTOLIC BLOOD PRESSURE: 142 MMHG | BODY MASS INDEX: 23.55 KG/M2 | TEMPERATURE: 98.5 F | WEIGHT: 159 LBS | HEART RATE: 81 BPM | DIASTOLIC BLOOD PRESSURE: 101 MMHG

## 2021-12-10 DIAGNOSIS — Z80.3 FAMILY HISTORY OF BREAST CANCER: Primary | ICD-10-CM

## 2021-12-10 PROCEDURE — G8482 FLU IMMUNIZE ORDER/ADMIN: HCPCS | Performed by: SURGERY

## 2021-12-10 PROCEDURE — G8427 DOCREV CUR MEDS BY ELIG CLIN: HCPCS | Performed by: SURGERY

## 2021-12-10 PROCEDURE — 99243 OFF/OP CNSLTJ NEW/EST LOW 30: CPT | Performed by: SURGERY

## 2021-12-10 PROCEDURE — G8420 CALC BMI NORM PARAMETERS: HCPCS | Performed by: SURGERY

## 2021-12-10 ASSESSMENT — ENCOUNTER SYMPTOMS
ABDOMINAL PAIN: 0
SHORTNESS OF BREATH: 0
COUGH: 0

## 2021-12-10 NOTE — PROGRESS NOTES
12/10/21    CHIEF COMPLAINT:  Family history of breast cancer. HISTORY OF PRESENT ILLNESS:  Pedro Ann is a 29 y.o. woman referred by BORA Talley who requested that I evaluate her for her family history of breast cancer. She presents today to discuss her individualized risk for breast cancer, the role of genetic testing, and the best mechanism for surveillance and/or prevention. The patient states that she herself has not noticed any abnormal masses in either breast.  She denies any change in the appearance of her breasts or the skin of her breasts. She denies bilateral nipple discharge. She has no other systemic complaints and otherwise feels well today. Pertinent Family History: A paternal cousin was diagnosed with breast cancer in her early 45s. (This was her dad's sister's daughter). She believes this cousin was positive for BRCA2 mutation.  (Of note, this cousin's father's sister was diagnosed with breast cancer at an unknown age.)    GYNECOLOGIC HISTORY:  Menarche at age 16    She delivered her first child at age 30, and did breastfeed  Premenopausal  No hysterectomy  Oral contraceptive use: yes for 15 years and is not currently taking  Hormone use: no and is not currently taking    Past Medical History:   Diagnosis Date    Anxiety 2016     Past Surgical History:   Procedure Laterality Date    COLONOSCOPY  2016    Dr. Tyson Driver to be normal.     GUM SURGERY      HEMORRHOID SURGERY  2016    internal hemorrhoid and anal warts    WISDOM TOOTH EXTRACTION  2004     Current Outpatient Medications   Medication Sig Dispense Refill    [place] 24 FE 1-20 MG-MCG(24) TABS Take 1 tablet by mouth daily      FLUoxetine (PROZAC) 20 MG capsule Take 1 capsule by mouth daily 90 capsule 3    Melatonin 10 MG TABS Take by mouth       No current facility-administered medications for this visit.      Allergies   Allergen Reactions    Promethazine-Codeine Rash    Diflucan [Fluconazole] Other (See Comments)     Tongue numb day after taken     Social History     Socioeconomic History    Marital status:      Spouse name: Not on file    Number of children: Not on file    Years of education: Not on file    Highest education level: Not on file   Occupational History    Not on file   Tobacco Use    Smoking status: Never Smoker    Smokeless tobacco: Never Used   Vaping Use    Vaping Use: Never used   Substance and Sexual Activity    Alcohol use: Yes     Alcohol/week: 2.0 standard drinks     Types: 2 Cans of beer per week    Drug use: No    Sexual activity: Yes     Partners: Male   Other Topics Concern    Not on file   Social History Narrative    Not on file     Social Determinants of Health     Financial Resource Strain: Low Risk     Difficulty of Paying Living Expenses: Not hard at all   Food Insecurity: No Food Insecurity    Worried About [address] in the Last Year: Never true    [address] in the Last Year: Never true   Transportation Needs:     Lack of Transportation (Medical): Not on file    Lack of Transportation (Non-Medical):  Not on file   Physical Activity:     Days of Exercise per Week: Not on file    Minutes of Exercise per Session: Not on file   Stress:     Feeling of Stress : Not on file   Social Connections:     Frequency of Communication with Friends and Family: Not on file    Frequency of Social Gatherings with Friends and Family: Not on file    Attends Orthodoxy Services: Not on file    Active Member of Clubs or Organizations: Not on file    Attends Club or Organization Meetings: Not on file    Marital Status: Not on file   Intimate Partner Violence:     Fear of Current or Ex-Partner: Not on file    Emotionally Abused: Not on file    Physically Abused: Not on file    Sexually Abused: Not on file   Housing Stability:     Unable to Pay for Housing in the Last Year: Not on file    Number of [place] in the Last Year: Not on file    Unstable Housing in the Last Year: Not on file     Family History   Problem Relation Age of Onset    Anxiety Disorder Mother     Other Father         abdominal aortic aneurysm    Anxiety Disorder Father     Cancer Father         Leukemia    High Blood Pressure Father     No Known Problems Brother     Other Paternal Grandmother         abdominal aortic aneurysm    No Known Problems Brother     Other Maternal Grandmother         hx rheumatic fever as child and passes away during second pregnancy related to heart disease    Cancer Paternal Grandfather     Breast Cancer Paternal Cousin 39        breast     REVIEW OF SYSTEMS:  Constitutional: Negative for unexpected weight change. Eyes: Negative for visual disturbance. Respiratory: Negative for cough and shortness of breath. Cardiovascular: Negative for chest pain and palpitations. Gastrointestinal: Negative for abdominal pain. Musculoskeletal: Negative for arthralgias and myalgias. Neurological: Negative for headaches. Hematological: Negative for adenopathy. Bruises/bleeds easily. Psychiatric/Behavioral: Negative for dysphoric mood. The patient is nervous/anxious (Fluoxitine). I personally reviewed and agree with the above ROS as documented by my medical assistant. PHYSICAL EXAMINATION:   Vitals: BP (!) 142/101 (Site: Left Upper Arm, Position: Sitting, Cuff Size: Medium Adult)   Pulse 81   Temp 98.5 °F (36.9 °C) (Infrared)   Resp 16   Ht 5' 9\" (1.753 m)   Wt 159 lb (72.1 kg)   SpO2 98%   BMI 23.48 kg/m²   General: Well-developed, well-nourished, in no apparent distress. Eyes:  Conjunctivae appear normal. Pupils are equal and reactive. Extraocular movements are intact. The sclerae are not injected and show no jaundice. Nose, Mouth and Throat:  Patient is wearing a mask. Respiratory: Normal respiratory effort, clear to auscultation bilaterally. Cardiovascular: Regular rate and rhythm.  No lower extremity edema. Gastrointestinal: Soft, nontender, nondistended, without obvious masses or hernias. Musculoskeletal: Normal gait and range of motion in all 4 extremities. Psychiatric: Alert and oriented x 3. Appropriate affect and behavior for today's visit. Skin: No concerning rashes, lesions, nodules or other skin changes. Lymphatic System:  No concerning cervical, supraclavicular or axillary lymphadenopathy. Breast Exam:  The breasts are normal in contour. Bra size \"small\". Right Breast: Examination of the right breast in the upright and supine positions reveal no obvious masses, skin changes, dimpling, or retraction. The nipple and areola are without erosion, edema or ulceration. There is no obvious nipple discharge. There is no concerning axillary adenopathy. Left Breast: Examination of the left breast in the upright and supine positions reveal no obvious masses, skin changes, dimpling, or retraction. The nipple and areola are without erosion, edema or ulceration. There is no obvious nipple discharge. There is no concerning axillary adenopathy. IMAGING: No breast imaging    RISK ASSESSMENT:  10/10/2021:  KARRI Risk Assessment tool (Tyrer-Cuzick Model), version 8: If the BRCA2 mutation is on Evelyn's paternal side of the family:  10 year risk: 6.9% (general population average 1.0%). Lifetime risk: 49.1% (general population average 13.2%). If the BRCA2 mutation is on her cousins paternal side of the family (and thus not blood related to Rashida Maier):  10 year risk: 1.2% (general population average 1.0%). Lifetime risk: 16.3% (general population average 13.2%). IMPRESSION/RECOMMENDATION:    Krunal Enriquez is a 29 y.o. woman who presents today for consultation regarding her family history of breast cancer. We discussed the risk of a hereditary cancer syndrome based on her family history, and the role of genetic counseling and testing.   Based on her risk, we will refer her for counseling +/- testing. We discussed that she does have a slightly increased risk of breast cancer when her cousins history is used for our risk model calculations. However if it is determined that her cousins BRCA mutation is from the other side of the family (and thus not related to this patient), then she is much closer to average risk. We discussed our recommendations for breast cancer surveillance which includes annual mammography starting at age 36 for average risk women. This is what I would recommend for her if her genetic testing is negative. If she does not undergo genetic testing, if her genetic testing is positive, or if anyone else in her family was diagnosed with breast cancer, I did recommend that she follow-up with me for further discussion about her recommended surveillance regimen. We stressed the importance of breast awareness and self breast evaluation was reviewed. I encouraged her to continue her self examinations on a monthly basis and to alert me of any changes. I answered all of her questions thoroughly, and she does seem pleased with this plan of approach. I encouraged her to contact me in the interim if any new questions or concerns arise.      Summary:  - Referral to genetics    Shanel Mejia MD

## 2021-12-10 NOTE — LETTER
[facility] Breast [facility]  [address]  Phone: [phone]  Fax: [phone]           Mir Fernandez MD      December 10, 2021     Patient: Saralee Bence   MR Number: <[id number]>   YOB: 1985   Date of Visit: 12/10/2021       Dear Dr. Charis Epperson: Thank you for referring Saralee Bence to me for evaluation/treatment. Below are the relevant portions of my assessment and plan of care. If you have questions, please do not hesitate to call me. I look forward to following Jose M Astorga along with you.     Sincerely,        Mir Fernandez MD    CC providers:  IVANA Galindo - CNP  [facility] [address] [place] [id number]  [address] [place]

## 2022-01-05 DIAGNOSIS — F41.9 ANXIETY: Primary | ICD-10-CM

## 2022-01-05 RX ORDER — LORAZEPAM 0.5 MG/1
0.5 TABLET ORAL SEE ADMIN INSTRUCTIONS
Qty: 2 TABLET | Refills: 0 | Status: SHIPPED | OUTPATIENT
Start: 2022-01-05 | End: 2022-01-06

## 2022-09-22 ENCOUNTER — TELEPHONE (OUTPATIENT)
Dept: FAMILY MEDICINE CLINIC | Age: 37
End: 2022-09-22

## 2022-09-22 NOTE — TELEPHONE ENCOUNTER
I do recommend changing to allergra daily. Also add flonase nasal spray using 1 spray each nostril 2 times daily.

## 2022-09-22 NOTE — TELEPHONE ENCOUNTER
Message from patient [employer]. Hi! I get allergies every year about this time, but this week has been particularly bad. Binu Horton been taking loratadine for a couple weeks to try and stay ahead of it, but Im not getting any symptom relief. I took an at home Covid test just to be sure and it was negative. My symptoms: itchy eyes, nasal congestion, scratchy throat, fatigue. But the most bothersome is a feeling of pressure/fullness in my ears, particularly when I swallow. Would you advise switching antihistamines at this point in the season? I have taken Allegra in the past. Adding a nasal spray? Thanks!

## 2022-09-27 LAB — PAP SMEAR, EXTERNAL: NORMAL

## 2022-09-29 ENCOUNTER — E-VISIT (OUTPATIENT)
Dept: FAMILY MEDICINE CLINIC | Age: 37
End: 2022-09-29
Payer: COMMERCIAL

## 2022-09-29 ENCOUNTER — TELEPHONE (OUTPATIENT)
Dept: FAMILY MEDICINE CLINIC | Age: 37
End: 2022-09-29

## 2022-09-29 DIAGNOSIS — L08.9 SKIN INFECTION: Primary | ICD-10-CM

## 2022-09-29 PROCEDURE — 99421 OL DIG E/M SVC 5-10 MIN: CPT | Performed by: NURSE PRACTITIONER

## 2022-09-29 NOTE — TELEPHONE ENCOUNTER
Per verbal conversation with Toni Metz she recommends that Kemi Rios submit an e-visit and attach a picture of the injection site. I spoke with Kemi Rios and she is agreeable to this.

## 2022-09-29 NOTE — TELEPHONE ENCOUNTER
----- Message from [facility] sent at 9/29/2022  4:59 PM EDT -----  Subject: Appointment Request    Reason for Call: Established Patient Appointment needed: Semi-Routine Skin   Problem    QUESTIONS    Reason for appointment request? No appointments available during search     Additional Information for Provider? Patient received a flu shot on   9/27/222 and states near the injection site there is blister that appeared   on 9/28/2022.  Please follow up with the patient to schedule   ---------------------------------------------------------------------------  --------------  Angela VIEYRA  [phone]; OK to leave message on voicemail, OK to respond with   electronic message via [employer] portal (only for patients who have   registered [employer] account)  ---------------------------------------------------------------------------  --------------  SCRIPT ANSWERS  COVID Screen: Selvin Sherman

## 2022-09-30 NOTE — PROGRESS NOTES
Diagnoses and all orders for this visit:    Skin infection  -     mupirocin (BACTROBAN) 2 % ointment; Apply topically 3 times daily.     See note to [place]    Time: 8 minutes

## 2022-11-17 ENCOUNTER — OFFICE VISIT (OUTPATIENT)
Dept: FAMILY MEDICINE CLINIC | Age: 37
End: 2022-11-17
Payer: COMMERCIAL

## 2022-11-17 VITALS
BODY MASS INDEX: 24.71 KG/M2 | DIASTOLIC BLOOD PRESSURE: 84 MMHG | WEIGHT: 163 LBS | HEIGHT: 68 IN | RESPIRATION RATE: 16 BRPM | OXYGEN SATURATION: 99 % | SYSTOLIC BLOOD PRESSURE: 120 MMHG | HEART RATE: 81 BPM

## 2022-11-17 DIAGNOSIS — Z00.00 ENCOUNTER FOR WELL ADULT EXAM WITHOUT ABNORMAL FINDINGS: Primary | ICD-10-CM

## 2022-11-17 DIAGNOSIS — F41.9 ANXIETY: ICD-10-CM

## 2022-11-17 PROBLEM — Z80.3 FAMILY HISTORY OF MALIGNANT NEOPLASM OF BREAST: Status: ACTIVE | Noted: 2022-11-17

## 2022-11-17 PROBLEM — Z84.81 FAMILY HISTORY OF BRCA2 GENE POSITIVE: Status: ACTIVE | Noted: 2022-11-17

## 2022-11-17 PROCEDURE — G8484 FLU IMMUNIZE NO ADMIN: HCPCS | Performed by: NURSE PRACTITIONER

## 2022-11-17 PROCEDURE — 99395 PREV VISIT EST AGE 18-39: CPT | Performed by: NURSE PRACTITIONER

## 2022-11-17 RX ORDER — FLUOXETINE 10 MG/1
10 CAPSULE ORAL DAILY
Qty: 90 CAPSULE | Refills: 3 | Status: SHIPPED | OUTPATIENT
Start: 2022-11-17

## 2022-11-17 ASSESSMENT — ENCOUNTER SYMPTOMS
CHEST TIGHTNESS: 0
NAUSEA: 0
BACK PAIN: 0
DIARRHEA: 0

## 2022-11-17 ASSESSMENT — PATIENT HEALTH QUESTIONNAIRE - PHQ9
2. FEELING DOWN, DEPRESSED OR HOPELESS: 0
SUM OF ALL RESPONSES TO PHQ QUESTIONS 1-9: 0
SUM OF ALL RESPONSES TO PHQ9 QUESTIONS 1 & 2: 0
SUM OF ALL RESPONSES TO PHQ QUESTIONS 1-9: 0
1. LITTLE INTEREST OR PLEASURE IN DOING THINGS: 0

## 2022-11-17 NOTE — PROGRESS NOTES
Well Adult Note  Name: Soo Marsh Date: 2022   MRN: [id number] Sex: Female   Age: 40 y.o. Ethnicity: Non- / Non    : 1985 Race: White (non-)      [facility] is here for well adult exam.  History:    Considering stopping prozac but not sure. Stopped during pregnancy and well at that time. Started after father passed    Feels like something is going on in the right abdomen. First noted 10 years ago. Has discussed with the OB/gyn and discussed special US but not done. Nothing noted during pregnancy. Denies pain, nausea, constipation. Review of Systems   Constitutional:  Negative for chills, fever and unexpected weight change. Respiratory:  Negative for chest tightness. Cardiovascular:  Negative for chest pain and palpitations. Gastrointestinal:  Negative for diarrhea and nausea. Musculoskeletal:  Negative for arthralgias, back pain and gait problem. Neurological:  Negative for dizziness and headaches. Psychiatric/Behavioral: Negative. Allergies   Allergen Reactions    Promethazine-Codeine Rash    Diflucan [Fluconazole] Other (See Comments)     Tongue numb day after taken         Prior to Visit Medications    Medication Sig Taking?  Authorizing Provider   FLUoxetine (PROZAC) 10 MG capsule Take 1 capsule by mouth daily Yes IVANA Schreiber - CNP   [place] 24 FE 1-20 MG-MCG(24) TABS Take 1 tablet by mouth daily Yes Historical Provider, MD   Melatonin 10 MG TABS Take by mouth Yes Historical Provider, MD         Past Medical History:   Diagnosis Date    Anxiety 2016       Past Surgical History:   Procedure Laterality Date    COLONOSCOPY  2016    Dr. Pieter Runner to be normal.     GUM SURGERY      HEMORRHOID SURGERY  2016    internal hemorrhoid and anal warts    WISDOM TOOTH EXTRACTION  2004         Family History   Problem Relation Age of Onset    Anxiety Disorder Mother     Other Father         abdominal aortic aneurysm Anxiety Disorder Father     Cancer Father         Leukemia    High Blood Pressure Father     No Known Problems Brother     Other Paternal Grandmother         abdominal aortic aneurysm    No Known Problems Brother     Other Maternal Grandmother         hx rheumatic fever as child and passes away during second pregnancy related to heart disease    Cancer Paternal Grandfather     Breast Cancer Paternal Cousin 39        breast       Social History     Tobacco Use    Smoking status: Never    Smokeless tobacco: Never   Vaping Use    Vaping Use: Never used   Substance Use Topics    Alcohol use: Yes     Alcohol/week: 2.0 standard drinks     Types: 2 Cans of beer per week    Drug use: No       Objective   /84 (Site: Right Upper Arm, Position: Sitting, Cuff Size: Medium Adult)   Pulse 81   Resp 16   Ht 5' 8.25\" (1.734 m)   Wt 163 lb (73.9 kg)   LMP 11/03/2022 (Approximate)   SpO2 99%   BMI 24.60 kg/m²   Wt Readings from Last 3 Encounters:   11/17/22 163 lb (73.9 kg)   12/10/21 159 lb (72.1 kg)   11/02/21 159 lb 12.8 oz (72.5 kg)     There were no vitals filed for this visit. Physical Exam  Constitutional:       Appearance: Normal appearance. She is well-developed. HENT:      Head: Normocephalic and atraumatic. Neck:      Thyroid: No thyromegaly. Cardiovascular:      Rate and Rhythm: Normal rate and regular rhythm. Pulses: Normal pulses. Pulmonary:      Effort: Pulmonary effort is normal.      Breath sounds: Normal breath sounds. Abdominal:      General: Bowel sounds are normal. There is no distension. Palpations: Abdomen is soft. There is no mass. Tenderness: There is no abdominal tenderness. There is no guarding. Musculoskeletal:         General: Normal range of motion. Cervical back: Normal range of motion and neck supple. Skin:     General: Skin is warm. Neurological:      Mental Status: She is alert and oriented to person, place, and time.    Psychiatric: Behavior: Behavior normal.         Assessment   Plan   1. Encounter for well adult exam without abnormal findings  -     CBC; Future  -     Comprehensive Metabolic Panel; Future  -     Lipid Panel; Future  2. Anxiety  -     FLUoxetine (PROZAC) 10 MG capsule; Take 1 capsule by mouth daily, Disp-90 capsule, R-3Normal       Assessment of abdomen negative for abnormality.  Discussed monitoring for persistent change that occurs longer than 7-10 days, constipation, rectal bleeding, nausea, decreased appetite, etc.           Personalized Preventive Plan   Current Health Maintenance Status  Immunization History   Administered Date(s) Administered    COVID-19, PFIZER GRAY top, DO NOT Dilute, (age 15 y+), IM, 30 mcg/0.3 mL 01/13/2022    COVID-19, PFIZER PURPLE top, DILUTE for use, (age 15 y+), 30mcg/0.3mL 03/29/2021, 04/19/2021    Influenza Virus Vaccine 09/27/2022    Influenza, AFLURIA (age 1 yrs+), FLUZONE, (age 10 mo+), MDV, 0.5mL 09/29/2016    Influenza, FLUARIX, FLULAVAL, FLUZONE (age 10 mo+) AND AFLURIA, (age 1 y+), PF, 0.5mL 09/23/2020    Influenza, FLUCELVAX, (age 10 mo+), MDCK, PF, 0.5mL 11/02/2021    Tdap (Boostrix, Adacel) 09/29/2016        Health Maintenance   Topic Date Due    Varicella vaccine (1 of 2 - 2-dose childhood series) Never done    Hepatitis C screen  Never done    COVID-19 Vaccine (4 - Booster for Pena Peter series) 03/10/2022    Cervical cancer screen  08/11/2023    Depression Screen  11/17/2023    DTaP/Tdap/Td vaccine (2 - Td or Tdap) 09/29/2026    Flu vaccine  Completed    HIV screen  Completed    Hepatitis A vaccine  Aged Out    Hib vaccine  Aged Out    Meningococcal (ACWY) vaccine  Aged Out    Pneumococcal 0-64 years Vaccine  Aged Out     Recommendations for [employer] Due: see orders and patient instructions/AVS.    Return in about 1 year (around 11/17/2023) for annual physical exam.

## 2023-02-21 NOTE — RESULT ENCOUNTER NOTE
Your Covid-19 test resulted not detected/negative. What happens if I have a negative test?  Remember to wash your hands often, avoid touching your face, stay 6 feet from people you do not live with, and wear a cloth facemask when you go out in public. A negative COVID-19 test at one point in time does not mean you will stay negative. You could become ill with COVID-19 and/or test positive at any time. If you are a close contact of a confirmed or suspected case, continue to stay home and away from others until 14 days after your last exposure. If you do not have symptoms and were not in close contact with a confirmed or suspected case, you can stop isolating. If you currently have symptoms of COVID-19 and were not in close contact with a confirmed or suspected case, you should keep monitoring symptoms and talk to your doctor or other healthcare provider about staying home and if you need to get tested again. If you develop symptoms of COVID-19, stay at home and away from others and talk to your doctor or other healthcare provider about getting tested again. For additional information, visit coronavirus. [place].gov. For answers to your COVID-19 questions, call 9-366-1-ASK-[facility] (7-370.279.9937). Cyclophosphamide Pregnancy And Lactation Text: This medication is Pregnancy Category D and it isn't considered safe during pregnancy. This medication is excreted in breast milk.

## 2023-03-19 DIAGNOSIS — L08.9 SKIN INFECTION: ICD-10-CM

## 2023-03-20 RX ORDER — FLUOXETINE HYDROCHLORIDE 20 MG/1
CAPSULE ORAL
Qty: 90 CAPSULE | Refills: 3 | OUTPATIENT
Start: 2023-03-20

## 2023-03-27 DIAGNOSIS — F41.9 ANXIETY: ICD-10-CM

## 2023-03-27 RX ORDER — FLUOXETINE HYDROCHLORIDE 20 MG/1
20 CAPSULE ORAL DAILY
Qty: 90 CAPSULE | Refills: 2 | Status: SHIPPED | OUTPATIENT
Start: 2023-03-27

## 2023-03-27 RX ORDER — FLUOXETINE HYDROCHLORIDE 20 MG/1
20 CAPSULE ORAL DAILY
Qty: 90 CAPSULE | Refills: 1 | OUTPATIENT
Start: 2023-03-27

## 2023-12-04 ASSESSMENT — PATIENT HEALTH QUESTIONNAIRE - PHQ9
2. FEELING DOWN, DEPRESSED OR HOPELESS: 0
1. LITTLE INTEREST OR PLEASURE IN DOING THINGS: 0
SUM OF ALL RESPONSES TO PHQ QUESTIONS 1-9: 0
SUM OF ALL RESPONSES TO PHQ9 QUESTIONS 1 & 2: 0
1. LITTLE INTEREST OR PLEASURE IN DOING THINGS: NOT AT ALL
SUM OF ALL RESPONSES TO PHQ QUESTIONS 1-9: 0
2. FEELING DOWN, DEPRESSED OR HOPELESS: NOT AT ALL
SUM OF ALL RESPONSES TO PHQ9 QUESTIONS 1 & 2: 0

## 2023-12-07 ENCOUNTER — OFFICE VISIT (OUTPATIENT)
Dept: FAMILY MEDICINE CLINIC | Age: 38
End: 2023-12-07
Payer: COMMERCIAL

## 2023-12-07 VITALS
HEART RATE: 75 BPM | OXYGEN SATURATION: 98 % | BODY MASS INDEX: 24.94 KG/M2 | RESPIRATION RATE: 16 BRPM | WEIGHT: 168.4 LBS | DIASTOLIC BLOOD PRESSURE: 60 MMHG | SYSTOLIC BLOOD PRESSURE: 90 MMHG | HEIGHT: 69 IN

## 2023-12-07 DIAGNOSIS — Z00.00 ENCOUNTER FOR WELL ADULT EXAM WITHOUT ABNORMAL FINDINGS: Primary | ICD-10-CM

## 2023-12-07 PROCEDURE — 99395 PREV VISIT EST AGE 18-39: CPT | Performed by: NURSE PRACTITIONER

## 2023-12-07 ASSESSMENT — ENCOUNTER SYMPTOMS
NAUSEA: 0
BACK PAIN: 0
CHEST TIGHTNESS: 0
DIARRHEA: 0

## 2023-12-07 NOTE — PROGRESS NOTES
Well Adult Note  Name: Joon Yang Date: 2023   MRN: [id number] Sex: Female   Age: 41 y.o. Ethnicity: Non- / Non    : 1985 Race: White (non-)      Angel Luis Velez is here for well adult exam.  History:      Stopped prozac since her last visit. Stopped some time ago.  feels she is somewhat combative but she feels she is doing well. Having increased allergy symptoms in the fall. Wondering if needs to see an allergist.     Beronica Yoshi like she has bad breath chronically. Has discussed with dentist but no cause is noted. This is identified by her  and daughter. Denies GERD, heartburn. Has some popping and discomfort right shoulder/upper scapula only noted when initially lies down at night to sleep. Denies awakens her during the night, no symptoms during the day. No known injury. Review of Systems   Constitutional:  Negative for chills, fever and unexpected weight change. Respiratory:  Negative for chest tightness. Cardiovascular:  Negative for chest pain and palpitations. Gastrointestinal:  Negative for diarrhea and nausea. Genitourinary:  Negative for difficulty urinating. Musculoskeletal:  Negative for arthralgias, back pain and gait problem. Neurological:  Negative for dizziness and headaches. Psychiatric/Behavioral: Negative. Allergies   Allergen Reactions    Promethazine-Codeine Rash    Diflucan [Fluconazole] Other (See Comments)     Tongue numb day after taken         Prior to Visit Medications    Medication Sig Taking?  Authorizing Provider   [place] 24 FE 1-20 MG-MCG(24) TABS Take 1 tablet by mouth daily Yes Provider, MD Melva   Melatonin 10 MG TABS Take by mouth Yes Provider, MD Melva         Past Medical History:   Diagnosis Date    Anxiety 2016       Past Surgical History:   Procedure Laterality Date    COLONOSCOPY  2016    Dr. Mariajose Whitley to be normal.     GUM SURGERY  2015    HEMORRHOID SURGERY  2016

## 2024-01-06 DIAGNOSIS — L08.9 SKIN INFECTION: ICD-10-CM

## 2024-01-07 NOTE — TELEPHONE ENCOUNTER
Refill Request     CONFIRM preferred pharmacy with the patient.    If Mail Order Rx - Pend for 90 day refill.      Last Seen: Last Seen Department: 12/7/2023  Last Seen by PCP: 12/7/2023    Last Written: 3/20/2023 22g 0 refills    If no future appointment scheduled:  Review the last OV with PCP and review information for follow-up visit,  Route STAFF MESSAGE with patient name to the  Pool for scheduling with the following information:            -  Timing of next visit           -  Visit type ie Physical, OV, etc           -  Diagnoses/Reason ie. COPD, HTN - Do not use MEDICATION, Follow-up or CHECK UP - Give reason for visit      Next Appointment:   Future Appointments   Date Time Provider Department Center   12/12/2024 11:00 AM Karen Cervantes APRN - CNP [facility]       Message sent to  to schedule appt with patient?  NO      Requested Prescriptions     Pending Prescriptions Disp Refills    mupirocin (BACTROBAN) 2 % ointment [Pharmacy Med Name: MUPIROCIN 2% OINTMENT] 22 g      Sig: APPLY TOPICALLY 3 TIMES DAILY